# Patient Record
Sex: FEMALE | NOT HISPANIC OR LATINO | URBAN - METROPOLITAN AREA
[De-identification: names, ages, dates, MRNs, and addresses within clinical notes are randomized per-mention and may not be internally consistent; named-entity substitution may affect disease eponyms.]

---

## 2021-01-03 ENCOUNTER — TRANSFERRED RECORDS (OUTPATIENT)
Dept: HEALTH INFORMATION MANAGEMENT | Facility: CLINIC | Age: 46
End: 2021-01-03

## 2021-01-07 ENCOUNTER — TRANSFERRED RECORDS (OUTPATIENT)
Dept: HEALTH INFORMATION MANAGEMENT | Facility: CLINIC | Age: 46
End: 2021-01-07

## 2021-01-31 ENCOUNTER — TRANSFERRED RECORDS (OUTPATIENT)
Dept: HEALTH INFORMATION MANAGEMENT | Facility: CLINIC | Age: 46
End: 2021-01-31

## 2021-04-23 ENCOUNTER — TRANSFERRED RECORDS (OUTPATIENT)
Dept: HEALTH INFORMATION MANAGEMENT | Facility: CLINIC | Age: 46
End: 2021-04-23

## 2021-07-11 ENCOUNTER — TRANSFERRED RECORDS (OUTPATIENT)
Dept: HEALTH INFORMATION MANAGEMENT | Facility: CLINIC | Age: 46
End: 2021-07-11

## 2021-08-12 ENCOUNTER — TRANSFERRED RECORDS (OUTPATIENT)
Dept: HEALTH INFORMATION MANAGEMENT | Facility: CLINIC | Age: 46
End: 2021-08-12

## 2022-03-28 ENCOUNTER — TRANSFERRED RECORDS (OUTPATIENT)
Dept: HEALTH INFORMATION MANAGEMENT | Facility: CLINIC | Age: 47
End: 2022-03-28

## 2022-06-11 ENCOUNTER — TRANSFERRED RECORDS (OUTPATIENT)
Dept: HEALTH INFORMATION MANAGEMENT | Facility: CLINIC | Age: 47
End: 2022-06-11

## 2022-06-11 LAB — EJECTION FRACTION: 60 %

## 2022-06-14 ENCOUNTER — TRANSFERRED RECORDS (OUTPATIENT)
Dept: HEALTH INFORMATION MANAGEMENT | Facility: CLINIC | Age: 47
End: 2022-06-14

## 2022-06-16 ENCOUNTER — TRANSFERRED RECORDS (OUTPATIENT)
Dept: HEALTH INFORMATION MANAGEMENT | Facility: CLINIC | Age: 47
End: 2022-06-16

## 2022-08-28 ENCOUNTER — TRANSFERRED RECORDS (OUTPATIENT)
Dept: HEALTH INFORMATION MANAGEMENT | Facility: CLINIC | Age: 47
End: 2022-08-28

## 2022-10-30 ENCOUNTER — TRANSFERRED RECORDS (OUTPATIENT)
Dept: HEALTH INFORMATION MANAGEMENT | Facility: CLINIC | Age: 47
End: 2022-10-30

## 2022-11-06 ENCOUNTER — TRANSFERRED RECORDS (OUTPATIENT)
Dept: HEALTH INFORMATION MANAGEMENT | Facility: CLINIC | Age: 47
End: 2022-11-06

## 2022-11-09 ENCOUNTER — TRANSFERRED RECORDS (OUTPATIENT)
Dept: HEALTH INFORMATION MANAGEMENT | Facility: CLINIC | Age: 47
End: 2022-11-09

## 2022-11-10 ENCOUNTER — TRANSFERRED RECORDS (OUTPATIENT)
Dept: HEALTH INFORMATION MANAGEMENT | Facility: CLINIC | Age: 47
End: 2022-11-10

## 2022-12-02 ENCOUNTER — PATIENT OUTREACH (OUTPATIENT)
Dept: ONCOLOGY | Facility: CLINIC | Age: 47
End: 2022-12-02

## 2022-12-02 ENCOUNTER — TRANSCRIBE ORDERS (OUTPATIENT)
Dept: OTHER | Age: 47
End: 2022-12-02

## 2022-12-02 ENCOUNTER — PRE VISIT (OUTPATIENT)
Dept: ONCOLOGY | Facility: CLINIC | Age: 47
End: 2022-12-02

## 2022-12-02 DIAGNOSIS — C50.919 BREAST CANCER (H): Primary | ICD-10-CM

## 2022-12-02 NOTE — PROGRESS NOTES
"New Patient Oncology Nurse Navigator Note     Referring provider: Self     Referred to (specialty:) Medical Oncology      Date Referral Received: December 2, 2022     Evaluation for:  Breast cancer     Clinical History (per Nurse review of records provided):      1/3/21 - Mammography and ultrasound - Multiple lesions in LEFT breast with lobulated margin in upper outer quadrant and prominent left axillary lymph nodes     1/4/2021 - LEFT breast biopsy showed invasive carcinoma of no special type, Grade III, ER+, AK negative, Ki67: 90%, HER2 by FISH negative. IHC and FISH based molecular subtype: Luminal B. Estimated as 4 x 4 cm, hard mass at 2 o'clock position with axillary lymph node palpable measuring \"pea sized\" firm.     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  Presented with complaints of left breast lump for three years preceding.  Mammogram done 1/3/2020 showed multiple lesions in left breast with lobulated margin, prominent left axillary nodes.       1/6/21 - CT Chest and Abdomen - 3.8 X 3.5 X 3.9 cm heterogeneously enhancing lobulated mass in the upper outer quadrant of LEFT breast with no chest wall, skin, or nipple invasion with suspicious left axillary level (Please see source document.)     1/7/21 - Whole body MRI - Findings: There is approximately 3.8 X 3.5 cm sized heterogeneously enhancing lobulated mass with restricted diffusion noted in the upper outer quadrant of the LEFT breast.  Another 3.2 x 2.1 cm sized irregular heterogeneously enhancing lesion with restricted diffusion is noted adjacent to it.  Few marginally enlarged oval lymph nodes are noted in the LEFT axillary level I (largest measuring 14 x 10 mm).  Tiny rounded lymph nodes noted in the LEFT axillary level II  Approximately 9 x 8 mm sized enhancing nodular lesion is noted within the fibroglandular tissue in the outer lower quadrant of the right breast which " does not show restriction of diffusion in DWI images.  Approximately 3.4 x 2.8 mm sized nodular T2 low signal intensity lesion noted in the anterior lower uterine wall suggestive of fibroid.      1/7/21 - LEFT Modified radical mastectomy for multicentric left breast invasive carcinoma of NST Grade III. Discharged   1/25/21 -   Invasive cancer, Grade III, upper outer quadrant of RIGHT [sic] breast, Tmax - 5 cm, single focus, Focal DCIS, margin uninvolved, 3 cm from deep margin. LN  0/19, PT2N0 ER+, MN negative, and HER2 negative. Ki 67 - 90%     1/31/21 - Port inserted - Patient also admitted for L1C1D1 of adriamycin and cyclophosphamide.      11/22/21 - Discharge from radiation therapy - Dose (cGy 4005, Dose per Fraction (cGy) 267, 15 fractions. Primary operative bed of left chest wall.       Expecting patient to arrive in a month or two and be here for a couple of months.       Very limited records available to writer:  4/23/2021 - [facility] Report of breasts - follow up case of multicentric left breast invasive carcinoma, post MRM. Post chemotherapy.    Right breast approximately 0.6 x 0.5 cm sized well defined oval shaped hypoechoic lesion is noted at 4 o'clock position of right breast.  Internal hyperechoic area is noted. No calcification is noted.    Left breast No evidence of focal lesion in left chest wall. No enlarged lymph nodes in left axilla.     3/28/22 - Peripheral blood sample for germline mutations tested in BRCA 1 and BRCA2 genes  Pathogenic mutation detected in BRCA2 gene. Please see report in [facility].     6/14/22 - Whole Body PET showed no definite metabolically active disease in the body in current study.     6/16/22 - Right breast Mammogram - right breast shows scattered areas of fibro glandular density.  Few foci of round and punctate calcification are seen scattered in right breast.  No spiculated mass or suspicious calcifications are seen in right breast.  As compared to previous mammogram dated  12/23/21 done outside, there is no significant change noted.     10/30/22 - [facility]  15.9  Calcium 9.7 mg/dl (8.5-10.2)  CA-125 6.3 U/ml (<35.0)  11/6/22 - Thyroid US      11/6/22 -  [facility] Report of Abdomen and Pelvis - Grade II fatty change in liver, cholystectomy, intramural fibroid in uterine (Please see source document)     11/6/22 - Left breast US - Status post left modified radical mastectomy. No evidence of focal lesion in left chest wall. No enlarged lymph nodes in left axilla.    Impression:  Follow up case of multicentric left breast invasive carcinoma, post modified radical mastectomy.  Post chemotherapy. Current [facility] showing:              Subcentimeter sized oval shaped hypoechoic lesion with internal hyperechoic area at 4 o clock position of right breast likely intramammary lymph node.              No evidence of focal lesion in left chest wall              No evidence of enlarged lymph nodes in bilateral axilla.      11/6/22 - US neck thyroid gland: Right lobe: 3.4 X 0.9 X 0.8 cm, Left lobe 3.3 X 1.1 X 0.9 cm, Isthmus 1.8 mm     FU of multicentric left breast invasive carcinoma, post modified radical mastectomy.  Post chemotherapy.     Records Location: [facility]     Records Needed:   Breast imaging for past 5 years  Pathology report from core biopsy (January 2021)  Pathology report from modified radical mastectomy  Medical oncology consult and progress notes (critical d/t chemo history)  Surgeon consult and progress notes     12/2 15:41 - Telephoned and spoke with Benito to learn more about Rommel's needs.  She is not in country at present and probably will not come until January. This is a self-paying patient and Benito has already been given financial number to start the self pay process.

## 2022-12-02 NOTE — TELEPHONE ENCOUNTER
Action    Action Taken 12/2/22  WT from [facility] - spoke w/ Benito, pt's brother - pt has records, biopsy reports, imaging reports, imaging on pdf file. Benito will email to us shortly.    Confirmed pt's demographics - we had pt's name wrong (First name entered as last name, last name entered as first name - misspelled & incomplete) corrected in Epic.     Stage 3 Breast Cancer, originally diagnosed 1/2021, pt has been seen in [place] & [place]. Benito advised that it would be extremely difficult to get any more records/materials outside of what they are sending us. Benito advised that departments, providers, etc were between neighboring countries & areas of those countries.     Records, imaging reports, images on PDF, bx reports received, sent to HIM for STAT upload. Images are fairly decent quality. Emailed to [facility] for upload to PACS  11:04 AM

## 2023-02-03 NOTE — PROGRESS NOTES
Patient's travel to [place] from [facility] has been delayed and she needs to postpone consult with medical oncology here for 6 months. Call transferred to New Patient Scheduling to reschedule. Betty Escobar RN

## 2023-02-12 ENCOUNTER — HEALTH MAINTENANCE LETTER (OUTPATIENT)
Age: 48
End: 2023-02-12

## 2023-09-11 NOTE — TELEPHONE ENCOUNTER
RECORDS STATUS - BREAST    RECORDS REQUESTED FROM: [facility] - Please see Pre Visit 12/22/2022   DATE REQUESTED: 9/11

## 2023-10-22 ENCOUNTER — HEALTH MAINTENANCE LETTER (OUTPATIENT)
Age: 48
End: 2023-10-22

## 2023-11-02 NOTE — PROGRESS NOTES
Per New Patient Scheduling notes, on 10/10/23  spoke with Brother Gracie, he states Pt is in [place] still and does not feel well. States he/she will call back when she is int he country. Declines to reschedule at this time.

## 2023-11-06 ENCOUNTER — PRE VISIT (OUTPATIENT)
Dept: ONCOLOGY | Facility: CLINIC | Age: 48
End: 2023-11-06

## 2024-03-10 ENCOUNTER — HEALTH MAINTENANCE LETTER (OUTPATIENT)
Age: 49
End: 2024-03-10

## 2024-10-06 ENCOUNTER — HEALTH MAINTENANCE LETTER (OUTPATIENT)
Age: 49
End: 2024-10-06

## 2025-03-16 ENCOUNTER — HEALTH MAINTENANCE LETTER (OUTPATIENT)
Age: 50
End: 2025-03-16